# Patient Record
Sex: MALE | Race: WHITE | URBAN - METROPOLITAN AREA
[De-identification: names, ages, dates, MRNs, and addresses within clinical notes are randomized per-mention and may not be internally consistent; named-entity substitution may affect disease eponyms.]

---

## 2021-04-16 ENCOUNTER — APPOINTMENT (OUTPATIENT)
Dept: URBAN - METROPOLITAN AREA SURGERY 8 | Age: 31
Setting detail: DERMATOLOGY
End: 2021-04-16

## 2021-04-16 DIAGNOSIS — A63.0 ANOGENITAL (VENEREAL) WARTS: ICD-10-CM

## 2021-04-16 PROCEDURE — OTHER LIQUID NITROGEN GENITALS MULTI: OTHER

## 2021-04-16 PROCEDURE — OTHER ADDITIONAL NOTES: OTHER

## 2021-04-16 PROCEDURE — 46916 CRYOSURGERY ANAL LESION(S): CPT

## 2021-04-16 PROCEDURE — OTHER COUNSELING: OTHER

## 2021-04-16 PROCEDURE — 54056 CRYOSURGERY PENIS LESION(S): CPT

## 2021-04-16 ASSESSMENT — LOCATION ZONE DERM
LOCATION ZONE: GENITALIA
LOCATION ZONE: PENIS
LOCATION ZONE: ANUS

## 2021-04-16 ASSESSMENT — LOCATION DETAILED DESCRIPTION DERM
LOCATION DETAILED: LEFT DORSAL SHAFT OF PENIS
LOCATION DETAILED: ANTERIOR PERIANAL SKIN (MIDLINE)
LOCATION DETAILED: LEFT ANTERIOR SCROTUM

## 2021-04-16 ASSESSMENT — LOCATION SIMPLE DESCRIPTION DERM
LOCATION SIMPLE: ANTERIOR PERIANAL SKIN (MIDLINE)
LOCATION SIMPLE: PENIS
LOCATION SIMPLE: SCROTUM

## 2021-04-16 NOTE — PROCEDURE: LIQUID NITROGEN GENITALS MULTI
Render Post-Care Instructions In Note?: no
Total Number Of Lesions Treated: 8
Detail Level: Zone
Consent: The patient's consent was obtained including but not limited to risks of crusting, scabbing, blistering, scarring, darker or lighter pigmentary change, recurrence, incomplete removal and infection.
Post-Care Instructions: I reviewed with the patient in detail post-care instructions. Patient is to wear sunprotection, and avoid picking at any of the treated lesions. Pt may apply Vaseline to crusted or scabbing areas.

## 2021-04-16 NOTE — PROCEDURE: ADDITIONAL NOTES
Render Risk Assessment In Note?: no
Detail Level: Simple
Additional Notes: discussed sexual transmission and partner needs to be up to date on pap smears. avoid shaving or trimming pubic hair as can lead to spreading/new lesions\\n\\nrecommended he see PCP to get HPV vaccine as FDA increased age limit\\n\\nRTC 1 month

## 2021-04-16 NOTE — HPI: WARTS (VERRUCA)
How Severe Are Your Warts?: mild
Is This A New Presentation, Or A Follow-Up?: Warts
Treatment Number (Optional): 2
Additional History: Patient states that one of the warts is dark in color.

## 2022-11-17 ENCOUNTER — APPOINTMENT (OUTPATIENT)
Dept: URBAN - METROPOLITAN AREA SURGERY 9 | Age: 32
Setting detail: DERMATOLOGY
End: 2022-11-18

## 2022-11-17 DIAGNOSIS — A63.0 ANOGENITAL (VENEREAL) WARTS: ICD-10-CM

## 2022-11-17 PROCEDURE — OTHER LIQUID NITROGEN GENITALS MULTI: OTHER

## 2022-11-17 PROCEDURE — OTHER COUNSELING: OTHER

## 2022-11-17 PROCEDURE — 54056 CRYOSURGERY PENIS LESION(S): CPT

## 2022-11-17 PROCEDURE — OTHER OTHER: OTHER

## 2022-11-17 ASSESSMENT — LOCATION DETAILED DESCRIPTION DERM: LOCATION DETAILED: LEFT DORSAL SHAFT OF PENIS

## 2022-11-17 ASSESSMENT — LOCATION SIMPLE DESCRIPTION DERM: LOCATION SIMPLE: PENIS

## 2022-11-17 ASSESSMENT — LOCATION ZONE DERM: LOCATION ZONE: PENIS

## 2022-11-17 NOTE — PROCEDURE: OTHER
Render Risk Assessment In Note?: no
Other (Free Text): 5 additional on pubic skin.
Note Text (......Xxx Chief Complaint.): This diagnosis correlates with the
Detail Level: Zone

## 2023-01-04 ENCOUNTER — APPOINTMENT (OUTPATIENT)
Dept: URBAN - METROPOLITAN AREA SURGERY 9 | Age: 33
Setting detail: DERMATOLOGY
End: 2023-01-04

## 2023-01-04 DIAGNOSIS — A63.0 ANOGENITAL (VENEREAL) WARTS: ICD-10-CM

## 2023-01-04 PROCEDURE — OTHER OTHER: OTHER

## 2023-01-04 PROCEDURE — OTHER LIQUID NITROGEN GENITALS MULTI: OTHER

## 2023-01-04 PROCEDURE — OTHER MIPS QUALITY: OTHER

## 2023-01-04 PROCEDURE — OTHER COUNSELING: OTHER

## 2023-01-04 PROCEDURE — 54056 CRYOSURGERY PENIS LESION(S): CPT

## 2023-01-04 ASSESSMENT — LOCATION DETAILED DESCRIPTION DERM: LOCATION DETAILED: LEFT DORSAL SHAFT OF PENIS

## 2023-01-04 ASSESSMENT — LOCATION ZONE DERM: LOCATION ZONE: PENIS

## 2023-01-04 ASSESSMENT — LOCATION SIMPLE DESCRIPTION DERM: LOCATION SIMPLE: PENIS

## 2023-01-04 NOTE — PROCEDURE: OTHER
Note Text (......Xxx Chief Complaint.): This diagnosis correlates with the
Other (Free Text): Previously frozen areas have decreased in size.
Detail Level: Simple
Render Risk Assessment In Note?: no

## 2023-01-04 NOTE — PROCEDURE: MIPS QUALITY
Detail Level: Detailed
Quality 226: Preventive Care And Screening: Tobacco Use: Screening And Cessation Intervention: Patient screened for tobacco use, is a smoker AND did not received Cessation Counseling for Medical Reasons within the Previous 12 Months
Quality 110: Preventive Care And Screening: Influenza Immunization: Influenza Immunization not Administered because Patient Refused.

## 2023-01-26 ENCOUNTER — APPOINTMENT (OUTPATIENT)
Dept: URBAN - METROPOLITAN AREA SURGERY 9 | Age: 33
Setting detail: DERMATOLOGY
End: 2023-01-26

## 2023-01-26 DIAGNOSIS — A63.0 ANOGENITAL (VENEREAL) WARTS: ICD-10-CM

## 2023-01-26 PROCEDURE — OTHER COUNSELING: OTHER

## 2023-01-26 PROCEDURE — OTHER LIQUID NITROGEN GENITALS MULTI: OTHER

## 2023-01-26 PROCEDURE — OTHER ADDITIONAL NOTES: OTHER

## 2023-01-26 PROCEDURE — 54056 CRYOSURGERY PENIS LESION(S): CPT

## 2023-01-26 ASSESSMENT — LOCATION DETAILED DESCRIPTION DERM
LOCATION DETAILED: PERIUNGUAL SKIN RIGHT MIDDLE FINGER
LOCATION DETAILED: LEFT DORSAL SHAFT OF PENIS

## 2023-01-26 ASSESSMENT — LOCATION SIMPLE DESCRIPTION DERM
LOCATION SIMPLE: PENIS
LOCATION SIMPLE: RIGHT MIDDLE FINGER

## 2023-01-26 ASSESSMENT — LOCATION ZONE DERM
LOCATION ZONE: PENIS
LOCATION ZONE: FINGER

## 2023-01-26 NOTE — PROCEDURE: ADDITIONAL NOTES
Detail Level: Simple
Additional Notes: Wart on finger treated as well.
Render Risk Assessment In Note?: no